# Patient Record
Sex: FEMALE | Race: WHITE | NOT HISPANIC OR LATINO | URBAN - METROPOLITAN AREA
[De-identification: names, ages, dates, MRNs, and addresses within clinical notes are randomized per-mention and may not be internally consistent; named-entity substitution may affect disease eponyms.]

---

## 2019-08-13 ENCOUNTER — APPOINTMENT (RX ONLY)
Dept: URBAN - METROPOLITAN AREA CLINIC 38 | Facility: CLINIC | Age: 69
Setting detail: DERMATOLOGY
End: 2019-08-13

## 2019-08-13 DIAGNOSIS — D22 MELANOCYTIC NEVI: ICD-10-CM

## 2019-08-13 DIAGNOSIS — L82.1 OTHER SEBORRHEIC KERATOSIS: ICD-10-CM

## 2019-08-13 DIAGNOSIS — L82.0 INFLAMED SEBORRHEIC KERATOSIS: ICD-10-CM

## 2019-08-13 PROBLEM — D22.9 MELANOCYTIC NEVI, UNSPECIFIED: Status: ACTIVE | Noted: 2019-08-13

## 2019-08-13 PROCEDURE — 17110 DESTRUCTION B9 LES UP TO 14: CPT

## 2019-08-13 PROCEDURE — 99202 OFFICE O/P NEW SF 15 MIN: CPT | Mod: 25

## 2019-08-13 PROCEDURE — ? LIQUID NITROGEN

## 2019-08-13 PROCEDURE — ? COUNSELING

## 2019-08-13 ASSESSMENT — LOCATION DETAILED DESCRIPTION DERM
LOCATION DETAILED: RIGHT INFERIOR UPPER BACK
LOCATION DETAILED: LEFT LATERAL UPPER BACK

## 2019-08-13 ASSESSMENT — LOCATION SIMPLE DESCRIPTION DERM
LOCATION SIMPLE: RIGHT UPPER BACK
LOCATION SIMPLE: LEFT UPPER BACK

## 2019-08-13 ASSESSMENT — LOCATION ZONE DERM: LOCATION ZONE: TRUNK

## 2019-08-13 NOTE — PROCEDURE: LIQUID NITROGEN
Medical Necessity Clause: This procedure was medically necessary because the lesions that were treated were:
Render Note In Bullet Format When Appropriate: No
Consent: The patient's consent was obtained including but not limited to risks of crusting, scabbing, blistering, scarring, darker or lighter pigmentary change, recurrence, incomplete removal and infection.
Detail Level: Detailed
Number Of Freeze-Thaw Cycles: 3 freeze-thaw cycles
Post-Care Instructions: I reviewed with the patient in detail post-care instructions. Patient is to wear sunprotection, and avoid picking at any of the treated lesions. Pt may apply Vaseline to crusted or scabbing areas.
Medical Necessity Information: It is in your best interest to select a reason for this procedure from the list below. All of these items fulfill various CMS LCD requirements except the new and changing color options.

## 2019-08-13 NOTE — PROCEDURE: MIPS QUALITY
Quality 111:Pneumonia Vaccination Status For Older Adults: Pneumococcal Vaccination Previously Received
Detail Level: Detailed
Quality 226: Preventive Care And Screening: Tobacco Use: Screening And Cessation Intervention: Patient screened for tobacco use and is an ex/non-smoker
Quality 431: Preventive Care And Screening: Unhealthy Alcohol Use - Screening: Patient screened for unhealthy alcohol use using a single question and scores less than 2 times per year
Quality 110: Preventive Care And Screening: Influenza Immunization: Influenza Immunization Administered during Influenza season

## 2019-09-04 ENCOUNTER — APPOINTMENT (RX ONLY)
Dept: URBAN - METROPOLITAN AREA CLINIC 38 | Facility: CLINIC | Age: 69
Setting detail: DERMATOLOGY
End: 2019-09-04

## 2019-09-04 DIAGNOSIS — L81.4 OTHER MELANIN HYPERPIGMENTATION: ICD-10-CM

## 2019-09-04 DIAGNOSIS — L82.0 INFLAMED SEBORRHEIC KERATOSIS: ICD-10-CM | Status: IMPROVED

## 2019-09-04 DIAGNOSIS — D22 MELANOCYTIC NEVI: ICD-10-CM

## 2019-09-04 PROBLEM — D22.9 MELANOCYTIC NEVI, UNSPECIFIED: Status: ACTIVE | Noted: 2019-09-04

## 2019-09-04 PROCEDURE — 99213 OFFICE O/P EST LOW 20 MIN: CPT

## 2019-09-04 PROCEDURE — ? COUNSELING

## 2019-09-04 ASSESSMENT — LOCATION SIMPLE DESCRIPTION DERM
LOCATION SIMPLE: RIGHT UPPER BACK
LOCATION SIMPLE: LEFT UPPER BACK

## 2019-09-04 ASSESSMENT — LOCATION ZONE DERM: LOCATION ZONE: TRUNK

## 2019-09-04 ASSESSMENT — LOCATION DETAILED DESCRIPTION DERM
LOCATION DETAILED: LEFT MID-UPPER BACK
LOCATION DETAILED: RIGHT MID-UPPER BACK

## 2019-09-04 NOTE — PROCEDURE: MIPS QUALITY
Quality 111:Pneumonia Vaccination Status For Older Adults: Pneumococcal Vaccination Previously Received
Quality 226: Preventive Care And Screening: Tobacco Use: Screening And Cessation Intervention: Patient screened for tobacco use and is an ex/non-smoker
Quality 110: Preventive Care And Screening: Influenza Immunization: Influenza Immunization Administered during Influenza season
Detail Level: Detailed
Quality 431: Preventive Care And Screening: Unhealthy Alcohol Use - Screening: Patient screened for unhealthy alcohol use using a single question and scores less than 2 times per year

## 2020-02-11 ENCOUNTER — APPOINTMENT (RX ONLY)
Dept: URBAN - METROPOLITAN AREA CLINIC 38 | Facility: CLINIC | Age: 70
Setting detail: DERMATOLOGY
End: 2020-02-11

## 2020-02-11 DIAGNOSIS — D22 MELANOCYTIC NEVI: ICD-10-CM

## 2020-02-11 DIAGNOSIS — L80 VITILIGO: ICD-10-CM | Status: STABLE

## 2020-02-11 PROBLEM — D22.9 MELANOCYTIC NEVI, UNSPECIFIED: Status: ACTIVE | Noted: 2020-02-11

## 2020-02-11 PROCEDURE — ? COUNSELING

## 2020-02-11 PROCEDURE — ? ADDITIONAL NOTES

## 2020-02-11 PROCEDURE — 99213 OFFICE O/P EST LOW 20 MIN: CPT

## 2020-02-11 ASSESSMENT — LOCATION DETAILED DESCRIPTION DERM: LOCATION DETAILED: LEFT MEDIAL SUPERIOR CHEST

## 2020-02-11 ASSESSMENT — LOCATION SIMPLE DESCRIPTION DERM: LOCATION SIMPLE: CHEST

## 2020-02-11 ASSESSMENT — LOCATION ZONE DERM: LOCATION ZONE: TRUNK

## 2020-02-11 NOTE — PROCEDURE: ADDITIONAL NOTES
Detail Level: Simple
Additional Notes: Patient has had this condition for years. Since it has not been progressing, she is comfortable going without treatment at this time.

## 2020-02-11 NOTE — PROCEDURE: MIPS QUALITY
Quality 111:Pneumonia Vaccination Status For Older Adults: Pneumococcal Vaccination Previously Received
Quality 110: Preventive Care And Screening: Influenza Immunization: Influenza Immunization Administered during Influenza season
Quality 226: Preventive Care And Screening: Tobacco Use: Screening And Cessation Intervention: Patient screened for tobacco use and is an ex/non-smoker
Detail Level: Detailed
Quality 431: Preventive Care And Screening: Unhealthy Alcohol Use - Screening: Patient screened for unhealthy alcohol use using a single question and scores less than 2 times per year

## 2021-02-09 ENCOUNTER — APPOINTMENT (RX ONLY)
Dept: URBAN - METROPOLITAN AREA CLINIC 38 | Facility: CLINIC | Age: 71
Setting detail: DERMATOLOGY
End: 2021-02-09

## 2021-02-09 DIAGNOSIS — L82.0 INFLAMED SEBORRHEIC KERATOSIS: ICD-10-CM

## 2021-02-09 PROCEDURE — ? ADDITIONAL NOTES

## 2021-02-09 PROCEDURE — 99212 OFFICE O/P EST SF 10 MIN: CPT

## 2021-02-09 PROCEDURE — ? COUNSELING

## 2021-02-09 ASSESSMENT — LOCATION SIMPLE DESCRIPTION DERM: LOCATION SIMPLE: CHEST

## 2021-02-09 ASSESSMENT — LOCATION ZONE DERM: LOCATION ZONE: TRUNK

## 2021-02-09 ASSESSMENT — LOCATION DETAILED DESCRIPTION DERM: LOCATION DETAILED: RIGHT MEDIAL SUPERIOR CHEST

## 2021-02-09 NOTE — PROCEDURE: ADDITIONAL NOTES
Render Risk Assessment In Note?: no
Detail Level: Simple
Additional Notes: Offered treatment but patient declined

## 2022-02-01 ENCOUNTER — APPOINTMENT (RX ONLY)
Dept: URBAN - METROPOLITAN AREA CLINIC 38 | Facility: CLINIC | Age: 72
Setting detail: DERMATOLOGY
End: 2022-02-01

## 2022-02-01 DIAGNOSIS — L81.4 OTHER MELANIN HYPERPIGMENTATION: ICD-10-CM

## 2022-02-01 DIAGNOSIS — L57.0 ACTINIC KERATOSIS: ICD-10-CM

## 2022-02-01 DIAGNOSIS — D485 NEOPLASM OF UNCERTAIN BEHAVIOR OF SKIN: ICD-10-CM | Status: STABLE

## 2022-02-01 DIAGNOSIS — L82.1 OTHER SEBORRHEIC KERATOSIS: ICD-10-CM

## 2022-02-01 DIAGNOSIS — L72.8 OTHER FOLLICULAR CYSTS OF THE SKIN AND SUBCUTANEOUS TISSUE: ICD-10-CM

## 2022-02-01 PROBLEM — D48.5 NEOPLASM OF UNCERTAIN BEHAVIOR OF SKIN: Status: ACTIVE | Noted: 2022-02-01

## 2022-02-01 PROCEDURE — ? INCISION AND DRAINAGE

## 2022-02-01 PROCEDURE — ? PHOTO-DOCUMENTATION

## 2022-02-01 PROCEDURE — ? SHAVE REMOVAL

## 2022-02-01 PROCEDURE — 11302 SHAVE SKIN LESION 1.1-2.0 CM: CPT

## 2022-02-01 PROCEDURE — 99213 OFFICE O/P EST LOW 20 MIN: CPT | Mod: 25

## 2022-02-01 PROCEDURE — 10060 I&D ABSCESS SIMPLE/SINGLE: CPT

## 2022-02-01 PROCEDURE — ? LIQUID NITROGEN

## 2022-02-01 PROCEDURE — 17000 DESTRUCT PREMALG LESION: CPT | Mod: 59

## 2022-02-01 PROCEDURE — ? COUNSELING

## 2022-02-01 PROCEDURE — 17003 DESTRUCT PREMALG LES 2-14: CPT

## 2022-02-01 ASSESSMENT — LOCATION DETAILED DESCRIPTION DERM
LOCATION DETAILED: RIGHT LATERAL SUPERIOR CHEST
LOCATION DETAILED: RIGHT MID-UPPER BACK
LOCATION DETAILED: LEFT SUPERIOR FOREHEAD
LOCATION DETAILED: RIGHT MEDIAL SUPERIOR CHEST
LOCATION DETAILED: INFERIOR THORACIC SPINE
LOCATION DETAILED: LEFT MEDIAL SUPERIOR CHEST
LOCATION DETAILED: RIGHT MEDIAL FRONTAL SCALP
LOCATION DETAILED: RIGHT SUPERIOR FOREHEAD
LOCATION DETAILED: LEFT SUPERIOR MEDIAL UPPER BACK
LOCATION DETAILED: LEFT PROXIMAL RADIAL THUMB
LOCATION DETAILED: SUPERIOR MID FOREHEAD
LOCATION DETAILED: LEFT LATERAL SUPERIOR CHEST

## 2022-02-01 ASSESSMENT — LOCATION ZONE DERM
LOCATION ZONE: FACE
LOCATION ZONE: SCALP
LOCATION ZONE: FINGER
LOCATION ZONE: TRUNK

## 2022-02-01 ASSESSMENT — LOCATION SIMPLE DESCRIPTION DERM
LOCATION SIMPLE: LEFT UPPER BACK
LOCATION SIMPLE: RIGHT UPPER BACK
LOCATION SIMPLE: RIGHT FOREHEAD
LOCATION SIMPLE: SUPERIOR FOREHEAD
LOCATION SIMPLE: CHEST
LOCATION SIMPLE: LEFT FOREHEAD
LOCATION SIMPLE: RIGHT SCALP
LOCATION SIMPLE: UPPER BACK
LOCATION SIMPLE: LEFT THUMB

## 2022-02-01 NOTE — PROCEDURE: INCISION AND DRAINAGE
Detail Level: Detailed
Lesion Type: Cyst
Method: sterile needle
Curette: No
Anesthesia Type: 1% lidocaine with 1:200,000 epinephrine and a 1:10 solution of 8.4% sodium bicarbonate
Anesthesia Volume In Cc: 0.5
Size Of Lesion In Cm (Optional But May Be Required For Some Insurances): 0
Dressing: no dressing
Epidermal Sutures: 4-0 Ethilon
Epidermal Closure: simple interrupted
Suture Text: The incision was partially closed with
Preparation Text: The area was prepped in the usual clean fashion.
Curette Text (Optional): After the contents were expressed a curette was used to partially remove the cyst wall.
Consent was obtained and risks were reviewed including but not limited to delayed wound healing, infection, need for multiple I and D's, and pain.
Render Postcare In Note?: Yes
Post-Care Instructions: I reviewed with the patient in detail post-care instructions. Patient should keep wound covered and call the office should any redness, pain, swelling or worsening occur.

## 2022-02-01 NOTE — PROCEDURE: SHAVE REMOVAL
Medical Necessity Information: It is in your best interest to select a reason for this procedure from the list below. All of these items fulfill various CMS LCD requirements except the new and changing color options.
Medical Necessity Clause: This procedure was medically necessary because the lesion that was treated was
Lab: [id number]
Lab Facility: [address] [place] [address]
Body Location Override (Optional - Billing Will Still Be Based On Selected Body Map Location If Applicable): LEFT SUPERIOR CHEST
Detail Level: Detailed
Was A Bandage Applied: Yes
Size Of Lesion In Cm (Required): 1.2
X Size Of Lesion In Cm (Optional): 0
Biopsy Method: Dermablade
Anesthesia Type: 1% lidocaine with epinephrine
Anesthesia Volume In Cc: 1.5
Hemostasis: Electrocautery
Wound Care: Bacitracin
Path Notes (To The Dermatopathologist): Size: 1.2 cm \\nRule out: DN VS. LM
Render Path Notes In Note?: No
Consent was obtained from the patient. The risks and benefits to therapy were discussed in detail. Specifically, the risks of infection, scarring, bleeding, prolonged wound healing, incomplete removal, allergy to anesthesia, nerve injury and recurrence were addressed. Prior to the procedure, the treatment site was clearly identified and confirmed by the patient. All components of Universal Protocol/PAUSE Rule completed.
Post-Care Instructions: I reviewed with the patient in detail post-care instructions. Patient is to keep the biopsy site dry overnight, and then apply bacitracin twice daily until healed. Patient may apply hydrogen peroxide soaks to remove any crusting.
Notification Instructions: Patient will be notified of pathology results. However, patient instructed to call the office if not contacted within 2 weeks.
Billing Type: [employer]

## 2022-02-01 NOTE — PROCEDURE: LIQUID NITROGEN
Consent: The patient's consent was obtained including but not limited to risks of crusting, scabbing, blistering, scarring, darker or lighter pigmentary change, recurrence, incomplete removal and infection.
Show Aperture Variable?: Yes
Number Of Freeze-Thaw Cycles: 3 freeze-thaw cycles
Duration Of Freeze Thaw-Cycle (Seconds): 1
Post-Care Instructions: I reviewed with the patient in detail post-care instructions. Patient is to wear sunprotection, and avoid picking at any of the treated lesions. Pt may apply Vaseline to crusted or scabbing areas.
Detail Level: Zone
Render Note In Bullet Format When Appropriate: No

## 2022-08-01 ENCOUNTER — APPOINTMENT (RX ONLY)
Dept: URBAN - METROPOLITAN AREA CLINIC 38 | Facility: CLINIC | Age: 72
Setting detail: DERMATOLOGY
End: 2022-08-01

## 2022-08-01 DIAGNOSIS — M71 OTHER BURSOPATHIES: ICD-10-CM

## 2022-08-01 DIAGNOSIS — L80 VITILIGO: ICD-10-CM

## 2022-08-01 DIAGNOSIS — L81.4 OTHER MELANIN HYPERPIGMENTATION: ICD-10-CM

## 2022-08-01 DIAGNOSIS — L40.0 PSORIASIS VULGARIS: ICD-10-CM

## 2022-08-01 DIAGNOSIS — D485 NEOPLASM OF UNCERTAIN BEHAVIOR OF SKIN: ICD-10-CM

## 2022-08-01 DIAGNOSIS — D22 MELANOCYTIC NEVI: ICD-10-CM

## 2022-08-01 PROBLEM — D48.5 NEOPLASM OF UNCERTAIN BEHAVIOR OF SKIN: Status: ACTIVE | Noted: 2022-08-01

## 2022-08-01 PROBLEM — D22.9 MELANOCYTIC NEVI, UNSPECIFIED: Status: ACTIVE | Noted: 2022-08-01

## 2022-08-01 PROBLEM — M71.342 OTHER BURSAL CYST, LEFT HAND: Status: ACTIVE | Noted: 2022-08-01

## 2022-08-01 PROCEDURE — ? BIOPSY BY SHAVE METHOD

## 2022-08-01 PROCEDURE — ? SUNSCREEN RECOMMENDATIONS

## 2022-08-01 PROCEDURE — 11102 TANGNTL BX SKIN SINGLE LES: CPT | Mod: 59

## 2022-08-01 PROCEDURE — ? PHOTO-DOCUMENTATION

## 2022-08-01 PROCEDURE — 11306 SHAVE SKIN LESION 0.6-1.0 CM: CPT

## 2022-08-01 PROCEDURE — ? COUNSELING

## 2022-08-01 PROCEDURE — 99214 OFFICE O/P EST MOD 30 MIN: CPT | Mod: 25

## 2022-08-01 PROCEDURE — ? TREATMENT REGIMEN

## 2022-08-01 PROCEDURE — ? PRESCRIPTION

## 2022-08-01 PROCEDURE — ? SHAVE REMOVAL

## 2022-08-01 RX ORDER — FLUOCINONIDE 0.5 MG/ML
SOLUTION TOPICAL
Qty: 60 | Refills: 2 | Status: ERX | COMMUNITY
Start: 2022-08-01

## 2022-08-01 RX ADMIN — FLUOCINONIDE: 0.5 SOLUTION TOPICAL at 00:00

## 2022-08-01 ASSESSMENT — LOCATION ZONE DERM
LOCATION ZONE: FINGER
LOCATION ZONE: TRUNK

## 2022-08-01 ASSESSMENT — LOCATION SIMPLE DESCRIPTION DERM
LOCATION SIMPLE: RIGHT UPPER BACK
LOCATION SIMPLE: LEFT THUMB

## 2022-08-01 ASSESSMENT — LOCATION DETAILED DESCRIPTION DERM
LOCATION DETAILED: RIGHT SUPERIOR UPPER BACK
LOCATION DETAILED: DORSAL INTERPHALANGEAL JOINT LEFT THUMB

## 2022-08-01 NOTE — PROCEDURE: BIOPSY BY SHAVE METHOD
Body Location Override (Optional - Billing Will Still Be Based On Selected Body Map Location If Applicable): RIGHT MID SCAPULA
Detail Level: Detailed
Depth Of Biopsy: dermis
Was A Bandage Applied: Yes
Size Of Lesion In Cm: 0.6
X Size Of Lesion In Cm: 0
Biopsy Type: H and E
Biopsy Method: Personna blade
Anesthesia Type: 1% lidocaine with epinephrine and a 1:10 solution of 8.4% sodium bicarbonate
Anesthesia Volume In Cc (Will Not Render If 0): 1.5
Hemostasis: Drysol
Wound Care: Bacitracin
Dressing: Band-Aid
Destruction After The Procedure: No
Type Of Destruction Used: Curettage
Curettage Text: The wound bed was treated with curettage after the biopsy was performed.
Cryotherapy Text: The wound bed was treated with cryotherapy after the biopsy was performed.
Electrodesiccation Text: The wound bed was treated with electrodesiccation after the biopsy was performed.
Electrodesiccation And Curettage Text: The wound bed was treated with electrodesiccation and curettage after the biopsy was performed.
Silver Nitrate Text: The wound bed was treated with silver nitrate after the biopsy was performed.
Lab: [id number]
Lab Facility: [address] [place] [address]
Path Notes (To The Dermatopathologist): size: 0.6 cm\\nRule out: SCC VS BCC
Consent: Written consent was obtained and risks were reviewed including but not limited to scarring, infection, bleeding, scabbing, incomplete removal, nerve damage and allergy to anesthesia.
Post-Care Instructions: I reviewed with the patient in detail post-care instructions. Patient is to keep the biopsy site dry overnight, and then apply bacitracin twice daily until healed. Patient may apply hydrogen peroxide soaks to remove any crusting.
Notification Instructions: Patient will be notified of biopsy results. However, patient instructed to call the office if not contacted within 2 weeks.
Billing Type: [employer]
Information: Selecting Yes will display possible errors in your note based on the variables you have selected. This validation is only offered as a suggestion for you. PLEASE NOTE THAT THE VALIDATION TEXT WILL BE REMOVED WHEN YOU FINALIZE YOUR NOTE. IF YOU WANT TO FAX A PRELIMINARY NOTE YOU WILL NEED TO TOGGLE THIS TO 'NO' IF YOU DO NOT WANT IT IN YOUR FAXED NOTE.

## 2022-08-01 NOTE — PROCEDURE: SUNSCREEN RECOMMENDATIONS
Products Recommended: Elta MD UV clear
General Sunscreen Counseling: I recommended a broad spectrum sunscreen with a SPF of 30 or higher. I explained that SPF 30 sunscreens block approximately 97 percent of the sun's harmful rays. Sunscreens should be applied at least 15 minutes prior to expected sun exposure and then every 2 hours after that as long as sun exposure continues. If swimming or exercising sunscreen should be reapplied every 45 minutes to an hour after getting wet or sweating. One ounce, or the equivalent of a shot glass full of sunscreen, is adequate to protect the skin not covered by a bathing suit. I also recommended a lip balm with a sunscreen as well. Sun protective clothing can be used in lieu of sunscreen but must be worn the entire time you are exposed to the sun's rays.
Detail Level: Detailed

## 2022-08-01 NOTE — PROCEDURE: TREATMENT REGIMEN
Action 3: Continue
Other Instructions: fluocinonide 0.05 % topical solution \\nQuantity: 60.0 ml  Days Supply: 30\\nSig: Apply to affected area of ears daily.
Otc Regimen: Anti inflammatory diet
Detail Level: Zone

## 2024-02-28 ENCOUNTER — APPOINTMENT (RX ONLY)
Dept: URBAN - METROPOLITAN AREA CLINIC 38 | Facility: CLINIC | Age: 74
Setting detail: DERMATOLOGY
End: 2024-02-28

## 2024-02-28 DIAGNOSIS — D22 MELANOCYTIC NEVI: ICD-10-CM

## 2024-02-28 DIAGNOSIS — D485 NEOPLASM OF UNCERTAIN BEHAVIOR OF SKIN: ICD-10-CM

## 2024-02-28 DIAGNOSIS — R21 RASH AND OTHER NONSPECIFIC SKIN ERUPTION: ICD-10-CM

## 2024-02-28 DIAGNOSIS — L57.0 ACTINIC KERATOSIS: ICD-10-CM

## 2024-02-28 DIAGNOSIS — L82.0 INFLAMED SEBORRHEIC KERATOSIS: ICD-10-CM

## 2024-02-28 DIAGNOSIS — L80 VITILIGO: ICD-10-CM | Status: WORSENING

## 2024-02-28 PROBLEM — D48.5 NEOPLASM OF UNCERTAIN BEHAVIOR OF SKIN: Status: ACTIVE | Noted: 2024-02-28

## 2024-02-28 PROBLEM — D22.9 MELANOCYTIC NEVI, UNSPECIFIED: Status: ACTIVE | Noted: 2024-02-28

## 2024-02-28 PROCEDURE — ? BIOPSY BY PUNCH METHOD

## 2024-02-28 PROCEDURE — ? PRESCRIPTION

## 2024-02-28 PROCEDURE — ? TREATMENT REGIMEN

## 2024-02-28 PROCEDURE — ? COUNSELING

## 2024-02-28 PROCEDURE — 11104 PUNCH BX SKIN SINGLE LESION: CPT | Mod: 59

## 2024-02-28 PROCEDURE — 11105 PUNCH BX SKIN EA SEP/ADDL: CPT

## 2024-02-28 PROCEDURE — ? LIQUID NITROGEN

## 2024-02-28 PROCEDURE — 99214 OFFICE O/P EST MOD 30 MIN: CPT | Mod: 25

## 2024-02-28 PROCEDURE — 17004 DESTROY PREMAL LESIONS 15/>: CPT

## 2024-02-28 PROCEDURE — ? PHOTO-DOCUMENTATION

## 2024-02-28 PROCEDURE — 17111 DESTRUCTION B9 LESIONS 15/>: CPT | Mod: 59

## 2024-02-28 RX ORDER — RUXOLITINIB 15 MG/G
CREAM TOPICAL
Qty: 60 | Refills: 2 | Status: ERX | COMMUNITY
Start: 2024-02-28

## 2024-02-28 RX ORDER — HALOBETASOL PROPIONATE 0.5 MG/G
OINTMENT TOPICAL
Qty: 50 | Refills: 2 | Status: ERX | COMMUNITY
Start: 2024-02-28

## 2024-02-28 RX ORDER — MOMETASONE FUROATE 1 MG/G
CREAM TOPICAL
Qty: 45 | Refills: 2 | Status: ERX | COMMUNITY
Start: 2024-02-28

## 2024-02-28 RX ORDER — KETOCONAZOLE 20 MG/G
CREAM TOPICAL
Qty: 60 | Refills: 2 | Status: ERX | COMMUNITY
Start: 2024-02-28

## 2024-02-28 RX ADMIN — HALOBETASOL PROPIONATE: 0.5 OINTMENT TOPICAL at 00:00

## 2024-02-28 RX ADMIN — RUXOLITINIB: 15 CREAM TOPICAL at 00:00

## 2024-02-28 RX ADMIN — MOMETASONE FUROATE: 1 CREAM TOPICAL at 00:00

## 2024-02-28 RX ADMIN — KETOCONAZOLE: 20 CREAM TOPICAL at 00:00

## 2024-02-28 ASSESSMENT — SEVERITY VITILIGO: VITILIGO SEVERITY: 5

## 2024-02-28 ASSESSMENT — LOCATION SIMPLE DESCRIPTION DERM
LOCATION SIMPLE: LEFT ANKLE
LOCATION SIMPLE: RIGHT FOREARM
LOCATION SIMPLE: CHEST
LOCATION SIMPLE: LEFT SHOULDER
LOCATION SIMPLE: RIGHT SHOULDER
LOCATION SIMPLE: LEFT CHEEK
LOCATION SIMPLE: LEFT CALF
LOCATION SIMPLE: RIGHT HAND
LOCATION SIMPLE: RIGHT ANTERIOR NECK
LOCATION SIMPLE: HAIR
LOCATION SIMPLE: RIGHT POSTERIOR UPPER ARM
LOCATION SIMPLE: LEFT FOREARM
LOCATION SIMPLE: LEFT UPPER BACK
LOCATION SIMPLE: RIGHT ELBOW
LOCATION SIMPLE: LEFT ANTERIOR NECK
LOCATION SIMPLE: RIGHT BACK
LOCATION SIMPLE: RIGHT CHEEK
LOCATION SIMPLE: LEFT UPPER ARM
LOCATION SIMPLE: LEFT LOWER BACK
LOCATION SIMPLE: RIGHT UPPER ARM
LOCATION SIMPLE: RIGHT FOREHEAD
LOCATION SIMPLE: LEFT HAND
LOCATION SIMPLE: RIGHT UPPER BACK
LOCATION SIMPLE: POSTERIOR NECK
LOCATION SIMPLE: LEFT FOREHEAD

## 2024-02-28 ASSESSMENT — LOCATION DETAILED DESCRIPTION DERM
LOCATION DETAILED: HAIR
LOCATION DETAILED: RIGHT ANTERIOR DISTAL UPPER ARM
LOCATION DETAILED: RIGHT VENTRAL DISTAL FOREARM
LOCATION DETAILED: LEFT DISTAL CALF
LOCATION DETAILED: RIGHT MEDIAL UPPER BACK
LOCATION DETAILED: LEFT SUPERIOR MEDIAL UPPER BACK
LOCATION DETAILED: RIGHT INFERIOR FOREHEAD
LOCATION DETAILED: LEFT DISTAL DORSAL FOREARM
LOCATION DETAILED: RIGHT ANTERIOR SHOULDER
LOCATION DETAILED: RIGHT SUPERIOR MEDIAL UPPER BACK
LOCATION DETAILED: LEFT INFERIOR CENTRAL MALAR CHEEK
LOCATION DETAILED: LEFT INFERIOR POSTERIOR NECK
LOCATION DETAILED: LEFT FOREHEAD
LOCATION DETAILED: RIGHT SUPERIOR LATERAL BUCCAL CHEEK
LOCATION DETAILED: LEFT PROXIMAL DORSAL FOREARM
LOCATION DETAILED: RIGHT MID-UPPER BACK
LOCATION DETAILED: 4TH WEB SPACE LEFT HAND
LOCATION DETAILED: STERNAL NOTCH
LOCATION DETAILED: RIGHT INFERIOR CENTRAL MALAR CHEEK
LOCATION DETAILED: LEFT SUPERIOR UPPER BACK
LOCATION DETAILED: LEFT ANTERIOR PROXIMAL UPPER ARM
LOCATION DETAILED: RIGHT DISTAL DORSAL FOREARM
LOCATION DETAILED: RIGHT ULNAR DORSAL HAND
LOCATION DETAILED: RIGHT ELBOW
LOCATION DETAILED: LEFT ULNAR DORSAL HAND
LOCATION DETAILED: LEFT INFERIOR MEDIAL FOREHEAD
LOCATION DETAILED: RIGHT PROXIMAL DORSAL FOREARM
LOCATION DETAILED: RIGHT MEDIAL TRAPEZIAL NECK
LOCATION DETAILED: LEFT ANTERIOR DISTAL UPPER ARM
LOCATION DETAILED: RIGHT VENTRAL PROXIMAL FOREARM
LOCATION DETAILED: RIGHT RADIAL DORSAL HAND
LOCATION DETAILED: RIGHT LATERAL SUPERIOR CHEST
LOCATION DETAILED: LEFT SUPERIOR ANTERIOR NECK
LOCATION DETAILED: RIGHT ANTERIOR PROXIMAL UPPER ARM
LOCATION DETAILED: LEFT DORSAL MIDDLE METACARPOPHALANGEAL JOINT
LOCATION DETAILED: RIGHT SUPERIOR LATERAL UPPER BACK
LOCATION DETAILED: LEFT LATERAL MALAR CHEEK
LOCATION DETAILED: LEFT VENTRAL PROXIMAL FOREARM
LOCATION DETAILED: 2ND WEB SPACE LEFT HAND
LOCATION DETAILED: LEFT POSTERIOR ANKLE
LOCATION DETAILED: LEFT RADIAL DORSAL HAND
LOCATION DETAILED: RIGHT INFERIOR LATERAL NECK
LOCATION DETAILED: LEFT VENTRAL DISTAL FOREARM
LOCATION DETAILED: RIGHT INFERIOR MEDIAL UPPER BACK
LOCATION DETAILED: LEFT ANTERIOR SHOULDER
LOCATION DETAILED: RIGHT DORSAL MIDDLE METACARPOPHALANGEAL JOINT
LOCATION DETAILED: RIGHT INFERIOR ANTERIOR NECK
LOCATION DETAILED: RIGHT DISTAL POSTERIOR UPPER ARM
LOCATION DETAILED: LEFT SUPERIOR MEDIAL MIDBACK

## 2024-02-28 ASSESSMENT — LOCATION ZONE DERM
LOCATION ZONE: SCALP
LOCATION ZONE: ARM
LOCATION ZONE: HAND
LOCATION ZONE: TRUNK
LOCATION ZONE: FACE
LOCATION ZONE: LEG
LOCATION ZONE: NECK

## 2024-02-28 ASSESSMENT — BSA VITILIGO: % BODY COVERED IN VITILIGO: 24

## 2024-02-28 NOTE — PROCEDURE: LIQUID NITROGEN
Show Applicator Variable?: Yes
Spray Paint Text: The liquid nitrogen was applied to the skin utilizing a spray paint frosting technique.
Add 52 Modifier (Optional): no
Medical Necessity Information: It is in your best interest to select a reason for this procedure from the list below. All of these items fulfill various CMS LCD requirements except the new and changing color options.
Detail Level: Detailed
Post-Care Instructions: I reviewed with the patient in detail post-care instructions. Patient is to wear sunprotection, and avoid picking at any of the treated lesions. Pt may apply Vaseline to crusted or scabbing areas.
Consent: The patient's consent was obtained including but not limited to risks of crusting, scabbing, blistering, scarring, darker or lighter pigmentary change, recurrence, incomplete removal and infection.
Medical Necessity Clause: This procedure was medically necessary because the lesions that were treated were:
Number Of Freeze-Thaw Cycles: 1 freeze-thaw cycle
Duration Of Freeze Thaw-Cycle (Seconds): 3
Detail Level: Generalized

## 2024-02-28 NOTE — PROCEDURE: TREATMENT REGIMEN
Detail Level: Zone
Initiate Treatment: Opzelura 1.5 % topical cream twice daily.
Initiate Treatment: ketoconazole 2 % topical cream.\\nhalobetasol propionate 0.05 % topical ointment.

## 2024-02-28 NOTE — PROCEDURE: BIOPSY BY PUNCH METHOD
Detail Level: Detailed
Was A Bandage Applied: Yes
Punch Size In Mm: 4
Size Of Lesion In Cm (Optional): 0
Depth Of Punch Biopsy: dermis
Biopsy Type: H and E
Anesthesia Type: 2% lidocaine with epinephrine
Anesthesia Volume In Cc: 0.5
Hemostasis: None
Epidermal Sutures: 4-0 Nylon
Number Of Epidermal Sutures (Optional): 2
Wound Care: Petrolatum
Dressing: bandage
Suture Removal: 14 days
Patient Will Remove Sutures At Home?: No
Lab: [id number]
Lab Facility: [id number]
Path Notes (To The Dermatopathologist): Size: 4mm\\nR/o: Atopic dermatitis vs psoriasis vs allergic contact dermatitis
Consent: Written consent was obtained and risks were reviewed including but not limited to scarring, infection, bleeding, scabbing, incomplete removal, nerve damage and allergy to anesthesia.
Post-Care Instructions: I reviewed with the patient in detail post-care instructions. Patient is to keep the biopsy site dry overnight, and then apply bacitracin twice daily until healed. Patient may apply hydrogen peroxide soaks to remove any crusting.
Home Suture Removal Text: Patient was provided a home suture removal kit and will remove their sutures at home.  If they have any questions or difficulties they will call the office.
Notification Instructions: Patient will be notified of biopsy results. However, patient instructed to call the office if not contacted within 2 weeks.
Billing Type: Third-Party Bill
Information: Selecting Yes will display possible errors in your note based on the variables you have selected. This validation is only offered as a suggestion for you. PLEASE NOTE THAT THE VALIDATION TEXT WILL BE REMOVED WHEN YOU FINALIZE YOUR NOTE. IF YOU WANT TO FAX A PRELIMINARY NOTE YOU WILL NEED TO TOGGLE THIS TO 'NO' IF YOU DO NOT WANT IT IN YOUR FAXED NOTE.

## 2024-03-11 ENCOUNTER — APPOINTMENT (RX ONLY)
Dept: URBAN - METROPOLITAN AREA CLINIC 38 | Facility: CLINIC | Age: 74
Setting detail: DERMATOLOGY
End: 2024-03-11

## 2024-03-11 DIAGNOSIS — L259 CONTACT DERMATITIS AND OTHER ECZEMA, UNSPECIFIED CAUSE: ICD-10-CM | Status: INADEQUATELY CONTROLLED

## 2024-03-11 PROBLEM — L23.9 ALLERGIC CONTACT DERMATITIS, UNSPECIFIED CAUSE: Status: ACTIVE | Noted: 2024-03-11

## 2024-03-11 PROCEDURE — ? PATHOLOGY DISCUSSION

## 2024-03-11 PROCEDURE — ? TREATMENT REGIMEN

## 2024-03-11 PROCEDURE — ? PRESCRIPTION

## 2024-03-11 PROCEDURE — ? COUNSELING

## 2024-03-11 PROCEDURE — 99213 OFFICE O/P EST LOW 20 MIN: CPT

## 2024-03-11 RX ORDER — CLOBETASOL PROPIONATE 0.5 MG/ML
SOLUTION TOPICAL
Qty: 50 | Refills: 1 | Status: ERX | COMMUNITY
Start: 2024-03-11

## 2024-03-11 RX ADMIN — CLOBETASOL PROPIONATE: 0.5 SOLUTION TOPICAL at 00:00
